# Patient Record
Sex: MALE | Race: WHITE | NOT HISPANIC OR LATINO | Employment: OTHER | URBAN - METROPOLITAN AREA
[De-identification: names, ages, dates, MRNs, and addresses within clinical notes are randomized per-mention and may not be internally consistent; named-entity substitution may affect disease eponyms.]

---

## 2022-10-06 ENCOUNTER — TELEPHONE (OUTPATIENT)
Dept: GASTROENTEROLOGY | Facility: CLINIC | Age: 62
End: 2022-10-06
Payer: COMMERCIAL

## 2022-10-06 VITALS — HEIGHT: 70 IN | BODY MASS INDEX: 30.06 KG/M2 | WEIGHT: 210 LBS

## 2022-10-06 DIAGNOSIS — Z86.010 PERSONAL HISTORY OF COLONIC POLYPS: Primary | ICD-10-CM

## 2022-10-06 DIAGNOSIS — Z12.11 SCREENING FOR COLON CANCER: ICD-10-CM

## 2022-10-06 RX ORDER — TRAZODONE HYDROCHLORIDE 100 MG/1
100 TABLET ORAL DAILY
COMMUNITY
Start: 2022-09-16

## 2022-10-06 RX ORDER — METFORMIN HYDROCHLORIDE 500 MG/1
500 TABLET ORAL DAILY
COMMUNITY
Start: 2022-09-16

## 2022-10-06 RX ORDER — TADALAFIL 20 MG/1
20 TABLET ORAL DAILY
COMMUNITY
Start: 2022-04-29

## 2022-10-06 RX ORDER — ATORVASTATIN CALCIUM 40 MG/1
40 TABLET, FILM COATED ORAL DAILY
COMMUNITY
Start: 2022-08-23

## 2022-10-06 RX ORDER — AMLODIPINE AND BENAZEPRIL HYDROCHLORIDE 5; 20 MG/1; MG/1
5 CAPSULE ORAL DAILY
COMMUNITY
Start: 2022-07-27

## 2022-10-06 RX ORDER — LEVOTHYROXINE SODIUM 150 UG/1
150 TABLET ORAL DAILY
COMMUNITY
Start: 2022-08-01

## 2022-10-06 RX ORDER — TERAZOSIN 5 MG/1
5 CAPSULE ORAL DAILY
COMMUNITY
Start: 2022-09-27

## 2022-10-06 RX ORDER — METOPROLOL SUCCINATE 50 MG/1
50 TABLET, EXTENDED RELEASE ORAL DAILY
COMMUNITY
Start: 2022-09-23

## 2022-10-06 NOTE — TELEPHONE ENCOUNTER
Reached out to patient and got chart updated and scheduled for procedure and he will  prep instructions,patient voiced understood the instructions. Patient has prep already.KAREN

## 2022-10-10 NOTE — TELEPHONE ENCOUNTER
[facility]  [address] [place] [address]  Phone: [phone]  Fax: [phone]    History & Physical         Provider: Dr. Tish Perez    Patient Name: Renato BELTRAN (age):1960  62 y.o.           Gender: male   Phone: [phone]     Referring Physician: Lois Das     Vital Signs:   Height - 5'10  Weight - 210 lb   BMI -  30.13    Plan: Colonoscopy @ [facility]     Encounter Diagnoses   Name Primary?    Personal history of colonic polyps Yes    Screening for colon cancer            History:      Past Medical History:   Diagnosis Date    Basal cell carcinoma     Nose    BMI 30.0-30.9,adult     Disorder of thyroid, unspecified     Essential (primary) hypertension     High cholesterol     Personal history of colonic polyps     Type 2 diabetes mellitus with unspecified diabetic retinopathy without macular edema       Past Surgical History:   Procedure Laterality Date    COLONOSCOPY  2019    polyps    COLONOSCOPY      with Dr. Sinclair    KNEE SURGERY      x 9      Medication List with Changes/Refills   Current Medications    AMLODIPINE-BENAZEPRIL 5-20 MG (LOTREL) 5-20 MG PER CAPSULE    Take 5 capsules by mouth once daily.    ATORVASTATIN (LIPITOR) 40 MG TABLET    Take 40 mg by mouth once daily.    LEVOTHYROXINE (SYNTHROID) 150 MCG TABLET    Take 150 mcg by mouth once daily.    METFORMIN (GLUCOPHAGE) 500 MG TABLET    Take 500 mg by mouth once daily.    METOPROLOL SUCCINATE (TOPROL-XL) 50 MG 24 HR TABLET    Take 50 mg by mouth once daily.    TADALAFIL (CIALIS) 20 MG TAB    Take 20 mg by mouth Daily.    TERAZOSIN (HYTRIN) 5 MG CAPSULE    Take 5 mg by mouth once daily.    TRAZODONE (DESYREL) 100 MG TABLET    Take 100 mg by mouth once daily.      Review of patient's allergies indicates:   Allergen Reactions    Penicillins     Sulfa (sulfonamide antibiotics)       Family History   Problem  Relation Age of Onset    Breast cancer Mother 72      Social History     Tobacco Use    Smoking status: Never    Smokeless tobacco: Never   Substance Use Topics    Alcohol use: Never    Drug use: Never        Physical Examination:     General Appearance:___________________________  HEENT: _____________________________________  Abdomen:____________________________________  Heart:________________________________________  Lungs:_______________________________________  Extremities:___________________________________  Skin:_________________________________________  Endocrine:____________________________________  Genitourinary:_________________________________  Neurological:__________________________________      Patient has been evaluated immediately prior to sedation and is medically cleared for endoscopy with IVCS as an ASA class: ______      Physician Signature: _________________________       Date: ________  Time: ________

## 2023-01-04 ENCOUNTER — OUTSIDE PLACE OF SERVICE (OUTPATIENT)
Dept: GASTROENTEROLOGY | Facility: CLINIC | Age: 63
End: 2023-01-04

## 2023-01-04 LAB — CRC RECOMMENDATION EXT: NORMAL

## 2023-01-04 PROCEDURE — 45385 PR COLONOSCOPY,REMV LESN,SNARE: ICD-10-PCS | Mod: 33,,, | Performed by: INTERNAL MEDICINE

## 2023-01-04 PROCEDURE — 45385 COLONOSCOPY W/LESION REMOVAL: CPT | Mod: 33,,, | Performed by: INTERNAL MEDICINE

## 2023-01-10 ENCOUNTER — TELEPHONE (OUTPATIENT)
Dept: GASTROENTEROLOGY | Facility: CLINIC | Age: 63
End: 2023-01-10
Payer: COMMERCIAL

## 2023-01-10 NOTE — TELEPHONE ENCOUNTER
6 TA, 1 hyp, repeat colonoscopy in 3 years.   Notify patient. Update in Health Maintenance section of Epic.  NBP

## 2023-02-06 ENCOUNTER — DOCUMENTATION ONLY (OUTPATIENT)
Dept: GASTROENTEROLOGY | Facility: CLINIC | Age: 63
End: 2023-02-06
Payer: COMMERCIAL